# Patient Record
Sex: MALE | Race: WHITE | NOT HISPANIC OR LATINO | Employment: FULL TIME | URBAN - METROPOLITAN AREA
[De-identification: names, ages, dates, MRNs, and addresses within clinical notes are randomized per-mention and may not be internally consistent; named-entity substitution may affect disease eponyms.]

---

## 2021-10-06 PROBLEM — S83.232A COMPLEX TEAR OF MEDIAL MENISCUS OF LEFT KNEE AS CURRENT INJURY: Status: ACTIVE | Noted: 2021-10-06

## 2021-10-06 PROBLEM — M17.0 OSTEOARTHRITIS OF BOTH KNEES: Status: ACTIVE | Noted: 2021-10-06

## 2021-10-06 PROBLEM — S83.231A COMPLEX TEAR OF MEDIAL MENISCUS OF RIGHT KNEE AS CURRENT INJURY: Status: ACTIVE | Noted: 2021-10-06

## 2021-10-06 PROBLEM — S46.011A TRAUMATIC COMPLETE TEAR OF RIGHT ROTATOR CUFF: Status: ACTIVE | Noted: 2021-10-06

## 2022-02-21 PROBLEM — M17.12 PRIMARY OSTEOARTHRITIS OF LEFT KNEE: Status: ACTIVE | Noted: 2021-10-06

## 2022-08-16 ENCOUNTER — TELEPHONE (OUTPATIENT)
Dept: GASTROENTEROLOGY | Facility: CLINIC | Age: 67
End: 2022-08-16

## 2022-08-16 NOTE — TELEPHONE ENCOUNTER
----- Message from Yudith Landry sent at 7/7/2022  9:59 AM CDT -----  Regarding: appt access  Contact: Pt  Pt states that he is calling to schedule 1 yr repeat colonoscopy with Dr Harvey. Please call back at [phone]//thank you acc

## 2023-02-01 PROBLEM — S43.432D GLENOID LABRUM TEAR, LEFT, SUBSEQUENT ENCOUNTER: Status: ACTIVE | Noted: 2023-02-01

## 2023-02-01 PROBLEM — M19.012 OSTEOARTHRITIS OF GLENOHUMERAL JOINT, LEFT: Status: ACTIVE | Noted: 2023-02-01

## 2023-02-01 PROBLEM — M75.120 FULL THICKNESS ROTATOR CUFF TEAR: Status: ACTIVE | Noted: 2023-02-01

## 2023-06-21 ENCOUNTER — TELEPHONE (OUTPATIENT)
Dept: GASTROENTEROLOGY | Facility: CLINIC | Age: 68
End: 2023-06-21

## 2023-06-21 NOTE — TELEPHONE ENCOUNTER
----- Message from Anahi Castillo LPN sent at 6/2/2023 10:19 AM CDT -----  Regarding: FW: Colonoscopy  Contact: patient  Due for routine Colonoscopy, is not having any problems  ----- Message -----  From: Anahi Castillo LPN  Sent: 6/1/2023   4:44 PM CDT  To: Anahi Castillo LPN  Subject: FW: Colonoscopy                                    ----- Message -----  From: Alyssa Plasencia  Sent: 6/1/2023   3:36 PM CDT  To: [facility] Staff  Subject: Colonoscopy                                      Per phone call with patient, he stated that he would like to schedule an appointment to see the physician for a colonoscopy.  Please return call at [phone] (home).    Thanks,  SJ

## 2023-07-06 VITALS — HEIGHT: 71 IN | BODY MASS INDEX: 33.6 KG/M2 | WEIGHT: 240 LBS

## 2023-07-06 DIAGNOSIS — Z86.010 PERSONAL HISTORY OF COLONIC POLYPS: Primary | ICD-10-CM

## 2023-07-06 RX ORDER — DULOXETIN HYDROCHLORIDE 20 MG/1
20 CAPSULE, DELAYED RELEASE ORAL
COMMUNITY
Start: 2023-06-27

## 2023-07-06 NOTE — TELEPHONE ENCOUNTER
Returned callers call and got chart updated and scheduled. Pt states he had a agg prep last time and he still wasn't cleaned out and he was wondering if he needed a extra day or what. And pt voiced cpap use, but doesn't have heart stent or walking problem. Pt voiced that the instructions be placed in the mail.

## 2023-07-06 NOTE — TELEPHONE ENCOUNTER
----- Message from Anahi Castillo LPN sent at 6/27/2023  4:06 PM CDT -----  Regarding: FW: Colonoscopy  Contact: patient  [place]  Needs to be set up for 2 year routin Colonoscopy  ----- Message -----  From: Alyssa Plasencia  Sent: 6/27/2023   3:51 PM CDT  To: [facility] Staff  Subject: Colonoscopy                                      Per phone call with patient, he stated that he would like to schedule an appointment to have a colonoscopy to be done.  Please return call at [phone] (home).    Thanks,  SJ

## 2023-07-10 RX ORDER — SOD SULF/POT CHLORIDE/MAG SULF 1.479 G
12 TABLET ORAL DAILY
Qty: 24 TABLET | Refills: 0 | Status: SHIPPED | OUTPATIENT
Start: 2023-07-10

## 2023-07-10 NOTE — TELEPHONE ENCOUNTER
Patient will need two days (rather than one day) of clear liquid diet and MiraLAX 1-2 doses TID the week prior to colonoscopy with goal of very loose stools. Then regular prep instructions the day prior to colonoscopy. Order signed.  KN

## 2023-10-18 ENCOUNTER — TELEPHONE (OUTPATIENT)
Dept: GASTROENTEROLOGY | Facility: CLINIC | Age: 68
End: 2023-10-18
Payer: COMMERCIAL

## 2023-10-18 VITALS — HEIGHT: 71 IN | BODY MASS INDEX: 33.6 KG/M2 | WEIGHT: 240 LBS

## 2023-10-18 DIAGNOSIS — Z86.010 PERSONAL HISTORY OF COLONIC POLYPS: Primary | ICD-10-CM

## 2023-10-18 NOTE — TELEPHONE ENCOUNTER
"[facility]  [address] [place] [address]  Phone: [phone]  Fax: [phone]    History & Physical         Provider: Dr. Lianne Harvey    Patient Name: Froy Akhtar                                                                       (age):1955  68 y.o.           Gender: male   Phone: [phone]     Referring Physician: Robin Gordillo     Vital Signs:   Height - 5' 11"  Weight - 240 lb  BMI -  33.47    Plan: Colonoscopy w/ adult colonoscope stating prone @ [facility]    Encounter Diagnosis   Name Primary?    Personal history of colonic polyps Yes           History:      Past Medical History:   Diagnosis Date    HLD (hyperlipidemia)     Hypertension     Hypogonadism in male     Insomnia     Sleep apnea     13      Past Surgical History:   Procedure Laterality Date    APPENDECTOMY      KNEE ARTHROSCOPY Left 2013    ats / nilsa    KNEE ARTHROSCOPY Right 2013    ats / [facility]ilsa    LAPAROSCOPIC REPAIR OF UMBILICAL HERNIA      X2    SHOULDER ARTHROSCOPY Right 2010    ats /nilsa    SPLENECTOMY, TOTAL        Medication List with Changes/Refills   Current Medications    AZELASTINE-FLUTICASONE (DYMISTA) 137-50 MCG/SPRAY SPRY NASSAL SPRAY    Dymista 137 mcg-50 mcg/spray nasal spray    CANDESARTAN-HYDROCHLOROTHIAZIDE (ATACAND HCT) 16-12.5 MG PER TABLET    Take 1 tablet by mouth once daily.    DULOXETINE (CYMBALTA) 20 MG CAPSULE    Take 20 mg by mouth.    ESZOPICLONE (LUNESTA) 3 MG TAB    Take 3 mg by mouth nightly as needed.    FARXIGA 10 MG TABLET    Take 10 mg by mouth once daily.    FENOFIBRATE (TRICOR) 145 MG TABLET    Take 145 mg by mouth once daily.    SOD SULF-POT CHLORIDE-MAG SULF (SUTAB) 1.479-0.188- 0.225 GRAM TABLET    Take 12 tablets by mouth once daily. Take according to package instructions with indicated amount of water. No breakfast day before test. May substitute with Suprep, Clenpiq, Plenvu, Moviprep or GoLytely based on Rx " plan and patient preference.    TESTOSTERONE (AXIRON) 30 MG/ACTUATION (1.5 ML) SLPM    Place onto the skin.    TRAZODONE (DESYREL) 50 MG TABLET          Review of patient's allergies indicates:   Allergen Reactions    Penicillins Rash      Family History   Problem Relation Age of Onset    Other Mother 45    No Known Problems Father     No Known Problems Sister     No Known Problems Sister     No Known Problems Sister     No Known Problems Sister     No Known Problems Sister     No Known Problems Sister     No Known Problems Brother     No Known Problems Brother     No Known Problems Brother     No Known Problems Brother     No Known Problems Brother     No Known Problems Brother     No Known Problems Maternal Grandmother     No Known Problems Maternal Grandfather     No Known Problems Paternal Grandmother     No Known Problems Paternal Grandfather       Social History     Tobacco Use    Smoking status: Every Day     Types: Cigarettes    Smokeless tobacco: Never   Substance Use Topics    Alcohol use: Yes     Alcohol/week: 3.0 standard drinks of alcohol     Types: 3 Cans of beer per week    Drug use: Never        Physical Examination:     General Appearance:___________________________  HEENT: _____________________________________  Abdomen:____________________________________  Heart:________________________________________  Lungs:_______________________________________  Extremities:___________________________________  Skin:_________________________________________  Endocrine:____________________________________  Genitourinary:_________________________________  Neurological:__________________________________      Patient has been evaluated immediately prior to sedation and is medically cleared for endoscopy with IVCS as an ASA class: ______      Physician Signature: _________________________       Date: ________  Time: ________

## 2023-10-20 ENCOUNTER — TELEPHONE (OUTPATIENT)
Dept: GASTROENTEROLOGY | Facility: CLINIC | Age: 68
End: 2023-10-20
Payer: COMMERCIAL

## 2023-10-20 NOTE — TELEPHONE ENCOUNTER
Patient's prep instructions were emailed to him at [email or web address] - dmp

## 2023-10-20 NOTE — TELEPHONE ENCOUNTER
----- Message from Rome Villalpando sent at 10/19/2023 11:19 AM CDT -----  Contact: Pt  Pt is calling to get the instructions for his upcoming procedure and can be reached at [phone]/thanks/dbw     Date : 10/24

## 2023-10-24 ENCOUNTER — OUTSIDE PLACE OF SERVICE (OUTPATIENT)
Dept: GASTROENTEROLOGY | Facility: CLINIC | Age: 68
End: 2023-10-24

## 2023-10-24 PROCEDURE — 45385 PR COLONOSCOPY,REMV LESN,SNARE: ICD-10-PCS | Mod: 33,,, | Performed by: INTERNAL MEDICINE

## 2023-10-24 PROCEDURE — 45380 PR COLONOSCOPY,BIOPSY: ICD-10-PCS | Mod: 33,59,, | Performed by: INTERNAL MEDICINE

## 2023-10-24 PROCEDURE — 45385 COLONOSCOPY W/LESION REMOVAL: CPT | Mod: 33,,, | Performed by: INTERNAL MEDICINE

## 2023-10-24 PROCEDURE — 45380 COLONOSCOPY AND BIOPSY: CPT | Mod: 33,59,, | Performed by: INTERNAL MEDICINE

## 2023-10-25 LAB — SPECIMEN TO PATHOLOGY: NORMAL

## 2023-10-29 ENCOUNTER — TELEPHONE (OUTPATIENT)
Dept: GASTROENTEROLOGY | Facility: CLINIC | Age: 68
End: 2023-10-29
Payer: COMMERCIAL

## 2023-10-29 NOTE — TELEPHONE ENCOUNTER
4 TA, 1 hyp, repeat colonoscopy w/adult colonoscope starting prone in 3 years.     Notify patient. Confirm recall tab in appointment desk is up-to-date (update if needed).  [facility]

## 2025-07-28 ENCOUNTER — TELEPHONE (OUTPATIENT)
Dept: GASTROENTEROLOGY | Facility: CLINIC | Age: 70
End: 2025-07-28
Payer: COMMERCIAL

## 2025-07-28 NOTE — TELEPHONE ENCOUNTER
Patient states EGD and colonoscopy in [place] @ [facility] with Dr. Gamez July 16, 2025. Found polyps, removed during procedure. Patient states that he needs a follow up EGD to see if Duodenum ulcer found on 7/16/2025 is healing and wants Dr. Harvey to perform.   Notes are in encounters and CE, 7/16/25 and results f/u dated today from Dr. Gamez.    Patient family is all providers and nursing of different specialties and arranged for the procedures with Dr. Gamez following admission to [facility] for diverticulitis w/ micro-perforation. Pt trying to avoid another trip to [place].       Please advise and ignore previous staff message in regards to patient call need, details were not verified before this call, apologies. Information about last OV etc still same.

## 2025-07-28 NOTE — TELEPHONE ENCOUNTER
Copied from CRM #[id number]. Topic: Appointments - Appointment Access  >> Jul 28, 2025 10:38 AM Delphine wrote:  Type:  Appointment Access    Who Called:Froy Akhtar  Does the patient know what this is regarding?:appt  Would the patient rather a call back or a response via [facility]?   Best Call Back Number:[phone]  Additional Information: hosp f/u diverticulits micro perforation/ulcer , suggested f/u appt  >> Jul 28, 2025 10:57 AM Med Assistant Meghann wrote:

## 2025-08-06 NOTE — TELEPHONE ENCOUNTER
Notified pt that [facility] would like to add him to procedure schedule for EGD on 10/1/25 - Wed and to come into the office for OV with [facility] on 9/16/25 -Tues to updated medical chart and to received prep instructions. Pt acknowledge those dates will work. BIJU